# Patient Record
Sex: FEMALE | Race: BLACK OR AFRICAN AMERICAN | Employment: FULL TIME | URBAN - METROPOLITAN AREA
[De-identification: names, ages, dates, MRNs, and addresses within clinical notes are randomized per-mention and may not be internally consistent; named-entity substitution may affect disease eponyms.]

---

## 2018-12-07 ENCOUNTER — HOSPITAL ENCOUNTER (EMERGENCY)
Age: 23
Discharge: HOME | End: 2018-12-07
Attending: EMERGENCY MEDICINE
Payer: COMMERCIAL

## 2018-12-07 VITALS
HEIGHT: 64 IN | SYSTOLIC BLOOD PRESSURE: 126 MMHG | BODY MASS INDEX: 43.54 KG/M2 | DIASTOLIC BLOOD PRESSURE: 79 MMHG | OXYGEN SATURATION: 99 % | TEMPERATURE: 98.1 F | WEIGHT: 255 LBS | HEART RATE: 99 BPM | RESPIRATION RATE: 16 BRPM

## 2018-12-07 DIAGNOSIS — R51.9 NONINTRACTABLE HEADACHE, UNSPECIFIED CHRONICITY PATTERN, UNSPECIFIED HEADACHE TYPE: Primary | ICD-10-CM

## 2018-12-07 LAB — HCG UR QL: NEGATIVE

## 2018-12-07 PROCEDURE — 74011250636 HC RX REV CODE- 250/636: Performed by: NURSE PRACTITIONER

## 2018-12-07 PROCEDURE — 99285 EMERGENCY DEPT VISIT HI MDM: CPT

## 2018-12-07 PROCEDURE — 96372 THER/PROPH/DIAG INJ SC/IM: CPT

## 2018-12-07 PROCEDURE — 81025 URINE PREGNANCY TEST: CPT

## 2018-12-07 RX ORDER — LISINOPRIL 20 MG/1
TABLET ORAL DAILY
COMMUNITY

## 2018-12-07 RX ORDER — METFORMIN HYDROCHLORIDE 500 MG/1
TABLET, FILM COATED, EXTENDED RELEASE ORAL
COMMUNITY

## 2018-12-07 RX ORDER — KETOROLAC TROMETHAMINE 30 MG/ML
30 INJECTION, SOLUTION INTRAMUSCULAR; INTRAVENOUS
Status: COMPLETED | OUTPATIENT
Start: 2018-12-07 | End: 2018-12-07

## 2018-12-07 RX ADMIN — KETOROLAC TROMETHAMINE 30 MG: 30 INJECTION, SOLUTION INTRAMUSCULAR at 17:09

## 2018-12-07 NOTE — LETTER
[facility] EMERGENCY DEPT 
[address] [place] [address] [place] [id number] 
[phone] Work/School Note Date: 12/7/2018 To Whom It May concern: 
 
Doc Torres was seen and treated today in the emergency room by the following provider(s): 
Attending Provider: Keegan Tobias MD 
Nurse Practitioner: Paul Villalobos NP. Doc Torres may return to work on 12/08/2018. Sincerely, Luke Rasmussen NP

## 2018-12-07 NOTE — ED PROVIDER NOTES
25 yo F with a hx of T2DM, headaches (see list)  presents ambulatory to the ED for evaluation of L sided headache since Monday with minimal relief this week. States she has a hx of headaches which are usually relieved by amitriptyline but states sx have been more persistent this week and were associated with vomiting x 2 this week. Pt states she has had nasal congestion for which she states she has taken OTC Tylenol Sinus with improvement of those sx. Pt denies visual disturbance, dizziness, weakness, numbness or tingling. She has not been evaluated for current sx and has not been evaluated by neurology. She denies neck pain, photophobia, or confusion. Her LMP was 11/3 but states she took a home pregnancy test this am which she states was negative Past Medical History: 
No date: Diabetes ([facility] [address] No date: Headache No date: Other ill-defined conditions(799.89) Comment:  genital herpes Social History Socioeconomic History Marital status: SINGLE Spouse name: Not on file Number of children: Not on file Years of education: Not on file Highest education level: Not on file Social Needs Financial resource strain: Not on file Food insecurity - worry: Not on file Food insecurity - inability: Not on file Transportation needs - medical: Not on file Transportation needs - non-medical: Not on file Occupational History Not on file Tobacco Use Smoking status: Never Smoker Substance and Sexual Activity Alcohol use: Yes Comment: social 
    Drug use: No 
    Sexual activity: Not on file Other Topics Concerns: 
      Not on file Social History Narrative Not on file Past Medical History:  
Diagnosis Date  Diabetes ([facility] [address]  Headache  Other ill-defined conditions(799.89) genital herpes Past Surgical History:  
Procedure Laterality Date  HX HEENT    
 tonsillectomy No family history on file. Social History Socioeconomic History  Marital status: SINGLE Spouse name: Not on file  Number of children: Not on file  Years of education: Not on file  Highest education level: Not on file Social Needs  Financial resource strain: Not on file  Food insecurity - worry: Not on file  Food insecurity - inability: Not on file  Transportation needs - medical: Not on file  Transportation needs - non-medical: Not on file Occupational History  Not on file Tobacco Use  Smoking status: Never Smoker Substance and Sexual Activity  Alcohol use: Yes Comment: social  
 Drug use: No  
 Sexual activity: Not on file Other Topics Concern  Not on file Social History Narrative  Not on file ALLERGIES: Sulfa (sulfonamide antibiotics) Review of Systems Constitutional: Negative for appetite change, fatigue and fever. HENT: Positive for congestion. Negative for rhinorrhea and tinnitus. Respiratory: Negative for chest tightness. Cardiovascular: Negative for chest pain and palpitations. Gastrointestinal: Negative for abdominal pain, constipation and diarrhea. Vomiting x 2 (Monday and Wednesday) Genitourinary: Negative for dysuria. Musculoskeletal: Negative for back pain, gait problem, myalgias, neck pain and neck stiffness. Skin: Negative for color change. Neurological: Positive for headaches. Negative for dizziness, seizures, speech difficulty, weakness, light-headedness and numbness. Psychiatric/Behavioral: Negative for confusion. Vitals:  
 12/07/18 1615 12/07/18 1638 12/07/18 1700 12/07/18 1730 BP: 134/78 156/90 125/78 126/79 Pulse:      
Resp:      
Temp:      
SpO2: 100% 95% 100% 99% Weight:      
Height:      
      
 
Physical Exam  
Constitutional: She is oriented to person, place, and time. She appears well-developed and well-nourished. No distress.   
HENT:  
 Head: Normocephalic and atraumatic. Eyes: Conjunctivae and EOM are normal. Pupils are equal, round, and reactive to light. Neck: Normal range of motion. Neck supple. No nuchal rigidity. No neck pain or cervical spinous process tenderness. Cardiovascular: Normal rate, regular rhythm, normal heart sounds and intact distal pulses. Pulmonary/Chest: Effort normal and breath sounds normal. No tachypnea. No respiratory distress. No SOB. B breath sounds CTA. No chest wall tenderness, tachypnea or tachycardia Abdominal: Soft. Bowel sounds are normal. She exhibits no distension and no mass. There is no tenderness. There is no rebound and no guarding. Soft, nontender abdomen with active BS throughout. Musculoskeletal: Normal range of motion. Neurological: She is alert and oriented to person, place, and time. She has normal strength. No cranial nerve deficit or sensory deficit. Coordination and gait normal. GCS eye subscore is 4. GCS verbal subscore is 5. GCS motor subscore is 6. Skin: Skin is warm and dry. Psychiatric: She has a normal mood and affect. Her behavior is normal. Judgment and thought content normal.  
Nursing note and vitals reviewed. MDM Number of Diagnoses or Management Options Nonintractable headache, unspecified chronicity pattern, unspecified headache type:  
Diagnosis management comments: 25 yo F with hx of L sided headache since Monday unrelieved by routine treatment. States she has a hx of headaches which are primarily L sided and for which she has been prescribed amitriptyline. Pt states she has taken amitriptyline BID this week without resolution of sx. She has vomited twice this week. Pt reports nasal congestion, which she states has been improved by Tylenol Sinus. Pt denies trauma or injury. Denies visual changes, neck pain, photophobia, or fever. Has not taken anything for discomfort today.   POC urine pregnancy ordered. Toradol ordered. Pt reassessed and reports improvement of discomfort. Discussed FU with neurology, (pt does not have PCP), return to ED for worsening sx which were reviewed with pt prior to discharge. Pt verbalized understanding and agreeable to plan of care. 1800:  Pt reports improvement of headache after administration of medication and states she is ready for discharge. Amount and/or Complexity of Data Reviewed Clinical lab tests: ordered and reviewed Patient Progress Patient progress: stable Procedures

## 2018-12-07 NOTE — ED TRIAGE NOTES
Pt reports that she started with migraine on Monday night. Pt has had intermittent pain and vomiting. Pt states that she blood in her vomit on Monday, but no other days.

## 2019-01-25 ENCOUNTER — OFFICE VISIT (OUTPATIENT)
Dept: NEUROLOGY | Age: 24
End: 2019-01-25

## 2019-01-25 VITALS
HEART RATE: 89 BPM | OXYGEN SATURATION: 98 % | RESPIRATION RATE: 20 BRPM | DIASTOLIC BLOOD PRESSURE: 82 MMHG | SYSTOLIC BLOOD PRESSURE: 138 MMHG | HEIGHT: 64 IN | WEIGHT: 262 LBS | BODY MASS INDEX: 44.73 KG/M2

## 2019-01-25 DIAGNOSIS — R42 DIZZY: ICD-10-CM

## 2019-01-25 DIAGNOSIS — R51.9 NEW ONSET HEADACHE: Primary | ICD-10-CM

## 2019-01-25 DIAGNOSIS — H53.9 VISUAL DISTURBANCE: ICD-10-CM

## 2019-01-25 PROBLEM — E66.01 OBESITY, MORBID (HCC): Status: ACTIVE | Noted: 2019-01-25

## 2019-01-25 RX ORDER — BUTALBITAL, ACETAMINOPHEN AND CAFFEINE 50; 325; 40 MG/1; MG/1; MG/1
TABLET ORAL
Refills: 1 | COMMUNITY
Start: 2019-01-04

## 2019-01-25 RX ORDER — INSULIN DETEMIR 100 [IU]/ML
INJECTION, SOLUTION SUBCUTANEOUS
COMMUNITY
Start: 2018-12-21

## 2019-01-25 RX ORDER — CEFDINIR 300 MG/1
CAPSULE ORAL
Refills: 0 | COMMUNITY
Start: 2019-01-04

## 2019-01-25 RX ORDER — TOPIRAMATE 50 MG/1
TABLET, FILM COATED ORAL
Refills: 1 | COMMUNITY
Start: 2019-01-04

## 2019-01-25 NOTE — PROGRESS NOTES
Chief Complaint Patient presents with  New Patient  Migraine 3 mo straight with only 3 days of relief. positive n/v, blurred vision in L eye where HA occur.
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
## 2019-02-06 ENCOUNTER — OFFICE VISIT (OUTPATIENT)
Dept: NEUROLOGY | Age: 24
End: 2019-02-06

## 2019-02-06 DIAGNOSIS — R42 DIZZY: Primary | ICD-10-CM

## 2019-02-06 DIAGNOSIS — H53.9 VISUAL DISTURBANCE: ICD-10-CM

## 2019-02-16 NOTE — PROCEDURES
EEG:      Date:  02/06/2019    Requesting Physician:  Silvio Watkins. MD Ramona     An EEG is requested in this 24year-old to evaluate for epileptiform abnormality. Medications:  Medications are listed as Topamax, Fioricet, Omnicef, Lisinopril, Valtrex, Levemir. This tracing is obtained during the awake state. During wakefulness, there are brief intermittent runs of posteriorly-dominant and symmetrical low-to-medium amplitude 9 cycle per second activities which attenuate with eye opening. Lower-voltage faster-frequency activities are seen symmetrically over the anterior head regions. Hyperventilation is performed for three minutes with a good effort and little alters the tracing. Intermittent photic stimulation little alters the tracing. Sleep is not attained. Interpretation: This EEG recorded during the awake state is normal.  No epileptiform abnormalities are seen.

## 2019-02-18 ENCOUNTER — TELEPHONE (OUTPATIENT)
Dept: NEUROLOGY | Age: 24
End: 2019-02-18

## 2019-03-01 ENCOUNTER — HOSPITAL ENCOUNTER (OUTPATIENT)
Dept: MRI IMAGING | Age: 24
Discharge: HOME | End: 2019-03-01
Attending: PSYCHIATRY & NEUROLOGY
Payer: COMMERCIAL

## 2019-03-01 DIAGNOSIS — H53.9 VISUAL DISTURBANCE: ICD-10-CM

## 2019-03-01 DIAGNOSIS — R51.9 NEW ONSET HEADACHE: ICD-10-CM

## 2019-03-01 DIAGNOSIS — R42 DIZZY: ICD-10-CM

## 2019-03-01 PROCEDURE — 74011250636 HC RX REV CODE- 250/636: Performed by: PSYCHIATRY & NEUROLOGY

## 2019-03-01 PROCEDURE — A9575 INJ GADOTERATE MEGLUMI 0.1ML: HCPCS | Performed by: PSYCHIATRY & NEUROLOGY

## 2019-03-01 PROCEDURE — 70553 MRI BRAIN STEM W/O & W/DYE: CPT

## 2019-03-01 RX ORDER — GADOTERATE MEGLUMINE 376.9 MG/ML
20 INJECTION INTRAVENOUS
Status: COMPLETED | OUTPATIENT
Start: 2019-03-01 | End: 2019-03-01

## 2019-03-01 RX ADMIN — GADOTERATE MEGLUMINE 20 ML: 376.9 INJECTION INTRAVENOUS at 17:18

## 2021-04-13 ENCOUNTER — TRANSCRIBE ORDER (OUTPATIENT)
Dept: SCHEDULING | Age: 26
End: 2021-04-13

## 2021-04-13 DIAGNOSIS — R80.9 PROTEINURIA: ICD-10-CM

## 2021-04-13 DIAGNOSIS — E11.21 DIABETIC GLOMERULOPATHY (HCC): Primary | ICD-10-CM

## 2021-04-13 DIAGNOSIS — I10 ESSENTIAL HYPERTENSION, MALIGNANT: ICD-10-CM
